# Patient Record
Sex: MALE
[De-identification: names, ages, dates, MRNs, and addresses within clinical notes are randomized per-mention and may not be internally consistent; named-entity substitution may affect disease eponyms.]

---

## 2018-08-09 ENCOUNTER — HOSPITAL ENCOUNTER (EMERGENCY)
Dept: HOSPITAL 14 - H.ER | Age: 63
Discharge: HOME | End: 2018-08-09
Payer: MEDICAID

## 2018-08-09 VITALS — RESPIRATION RATE: 16 BRPM | TEMPERATURE: 98.5 F

## 2018-08-09 VITALS — SYSTOLIC BLOOD PRESSURE: 120 MMHG | HEART RATE: 76 BPM | DIASTOLIC BLOOD PRESSURE: 70 MMHG

## 2018-08-09 DIAGNOSIS — Z79.84: ICD-10-CM

## 2018-08-09 DIAGNOSIS — E11.65: ICD-10-CM

## 2018-08-09 DIAGNOSIS — F17.200: ICD-10-CM

## 2018-08-09 DIAGNOSIS — B37.42: Primary | ICD-10-CM

## 2018-08-09 LAB
ALBUMIN SERPL-MCNC: 4.2 G/DL (ref 3.5–5)
ALBUMIN/GLOB SERPL: 1.3 {RATIO} (ref 1–2.1)
ALT SERPL-CCNC: 91 U/L (ref 21–72)
AST SERPL-CCNC: 110 U/L (ref 17–59)
BASOPHILS # BLD AUTO: 0.1 K/UL (ref 0–0.2)
BASOPHILS NFR BLD: 1.3 % (ref 0–2)
BILIRUB UR-MCNC: NEGATIVE MG/DL
BUN SERPL-MCNC: 7 MG/DL (ref 9–20)
CALCIUM SERPL-MCNC: 9.2 MG/DL (ref 8.4–10.2)
COLOR UR: (no result)
EOSINOPHIL # BLD AUTO: 0.1 K/UL (ref 0–0.7)
EOSINOPHIL NFR BLD: 1.8 % (ref 0–4)
ERYTHROCYTE [DISTWIDTH] IN BLOOD BY AUTOMATED COUNT: 12.4 % (ref 11.5–14.5)
GFR NON-AFRICAN AMERICAN: > 60
GLUCOSE UR STRIP-MCNC: >=500 MG/DL
HGB BLD-MCNC: 14.5 G/DL (ref 12–18)
LEUKOCYTE ESTERASE UR-ACNC: (no result) LEU/UL
LYMPHOCYTES # BLD AUTO: 1.6 K/UL (ref 1–4.3)
LYMPHOCYTES NFR BLD AUTO: 28.9 % (ref 20–40)
MCH RBC QN AUTO: 34.6 PG (ref 27–31)
MCHC RBC AUTO-ENTMCNC: 34.7 G/DL (ref 33–37)
MCV RBC AUTO: 100 FL (ref 80–94)
MONOCYTES # BLD: 0.4 K/UL (ref 0–0.8)
MONOCYTES NFR BLD: 7.5 % (ref 0–10)
NEUTROPHILS # BLD: 3.3 K/UL (ref 1.8–7)
NEUTROPHILS NFR BLD AUTO: 60.5 % (ref 50–75)
NRBC BLD AUTO-RTO: 0 % (ref 0–0)
PH UR STRIP: 6 [PH] (ref 5–8)
PLATELET # BLD: 69 K/UL (ref 130–400)
PMV BLD AUTO: 10.9 FL (ref 7.2–11.7)
PROT UR STRIP-MCNC: NEGATIVE MG/DL
RBC # BLD AUTO: 4.19 MIL/UL (ref 4.4–5.9)
RBC # UR STRIP: NEGATIVE /UL
SP GR UR STRIP: 1.02 (ref 1–1.03)
SQUAMOUS EPITHIAL: < 1 /HPF (ref 0–5)
URINE CLARITY: CLEAR
UROBILINOGEN UR-MCNC: (no result) MG/DL (ref 0.2–1)
WBC # BLD AUTO: 5.4 K/UL (ref 4.8–10.8)

## 2018-08-09 NOTE — ED PDOC
Hyperglycemia/Hypoglycemia


Time Seen by Provider: 08/09/18 17:00


Chief Complaint (Nursing): High Blood Sugar


Chief Complaint (Provider): groin pain


History Per: Patient, Family (daughter)


History/Exam Limitations: language barrier ([place])


Onset/Duration Of Symptoms: Days (x2)


Current Symptoms Are (Timing): Still Present


***: The patient does not have any of the infectious symptoms listed except for 

those marked.


Additional Complaint(s): 


62 year old male arrives to ED with daughter-in-law for an evaluation of 

dysuria associated with frequency since midnight. Patient admits to drinking 

alcohol earlier today. He denies any fever, chills, vomiting or bloody urine.





PMD: Dr. Basil Villagran





Past Medical History


Reviewed: Historical Data (whiskey daily), Nursing Documentation, Vital Signs


Vital Signs: 


 Last Vital Signs











Temp  98.5 F   08/09/18 16:43


 


Pulse  102 H  08/09/18 16:43


 


Resp  16   08/09/18 16:43


 


BP  121/87   08/09/18 16:43


 


Pulse Ox  95   08/09/18 16:43














- Medical History


PMH: Diabetes





- Surgical History


Surgical History: Appendectomy





- Family History


Family History: States: Unknown Family Hx





- Social History


Current smoker - smoking cessation education provided: Yes


Alcohol: > 2 Drinks/Day


Drugs: Denies





- Immunization History


Hx Tetanus Toxoid Vaccination: No


Hx Influenza Vaccination: No


Hx Pneumococcal Vaccination: No





- Home Medications


Home Medications: 


 Ambulatory Orders











 Medication  Instructions  Recorded


 


Clotrimazole 1% Cream [Lotrimin 1%] 1 applic EXT BID #1 tube 08/09/18


 


metFORMIN [glucOPHAGE] 500 mg PO BID #20 tab 08/09/18














- Allergies


Allergies/Adverse Reactions: 


 Allergies











Allergy/AdvReac Type Severity Reaction Status Date / Time


 


No Known Allergies Allergy   Verified 08/09/18 17:45














Review of Systems


ROS Statement: Except As Marked, All Systems Reviewed And Found Negative


Constitutional: Negative for: Fever, Chills


Gastrointestinal: Negative for: Nausea, Vomiting, Diarrhea


Genitourinary Male: Positive for: Dysuria, Frequency.  Negative for: Hematuria





Physical Exam





- Reviewed


Nursing Documentation Reviewed: Yes


Vital Signs Reviewed: Yes





- Physical Exam


Appears: Positive for: Non-toxic, No Acute Distress


Head Exam: Positive for: ATRAUMATIC, NORMAL INSPECTION, NORMOCEPHALIC


Skin: Positive for: Normal Color


Eye Exam: Positive for: Normal appearance


ENT: Positive for: Normal ENT Inspection


Neck: Positive for: Supple


Cardiovascular/Chest: Positive for: Regular Rate, Rhythm


Respiratory: Positive for: Normal Breath Sounds


Gastrointestinal/Abdominal: Positive for: Soft.  Negative for: Tenderness


Male Genital Exam: Positive for: other (circumcised male; red/white discharge 

noted, consistent with fungal infection. )


Extremity: Positive for: Normal ROM


Neurologic/Psych: Positive for: Alert, Oriented


Comments: 





ER Tech Russel Moris as chaperone for  exam.





- Laboratory Results


Result Diagrams: 


 08/09/18 18:55





 08/09/18 18:55





- ECG


O2 Sat by Pulse Oximetry: 95 (RA)


Pulse Ox Interpretation: Normal





Medical Decision Making


Medical Decision Making: 


Initial Impression: Dysuria rule out infection, electrolyte abnormality





Initial Plan:


* CMP


* CBC


* Urine C&S


* UA





1900


Patient with elevated blood sugar, insulin given.


Patient to be signed out to Dr. Spence pending labs and reeval.





--------------------------------------------------------------------------------

-----------------


Scribe Attestation:


Documented by Lina Jamil, acting as a scribe for Prakash Valdez MD.





Provider Scribe Attestation:


All medical record entries made by the Scribe were at my direction and 

personally dictated by me. I have reviewed the chart and agree that the record 

accurately reflects my personal performance of the history, physical exam, 

medical decision making, and the department course for this patient. I have 

also personally directed, reviewed, and agree with the discharge instructions 

and disposition.





Disposition





- Clinical Impression


Clinical Impression: 


 Candidal balanitis, Hyperglycemia








- Patient ED Disposition


Is Patient to be Admitted: Transfer of Care





- Disposition


Referrals: 


[facility] [Outside]


Disposition: Transfer of Care


Disposition Time: 19:00


Condition: STABLE


Prescriptions: 


Clotrimazole 1% Cream [Lotrimin 1%] 1 applic EXT BID #1 tube


metFORMIN [glucOPHAGE] 500 mg PO BID #20 tab


Instructions:  Balanitis, The ABCs of Diabetes


Forms:  [facility] (English)


Print Language: Stateless


Patient Signed Over To: Willie Spence

## 2018-08-09 NOTE — ED PDOC
- Laboratory Results


Result Diagrams: 


 18 18:55





 18 18:55





- ECG


O2 Sat by Pulse Oximetry: 99 (RA)


Pulse Ox Interpretation: Normal





Medical Decision Making


Medical Decision Makin


Patient signed out to me by Dr. Valdez pending labs and reassessment.





2100


Labs reviewed and patient has no clinically significant abnormalities. 


On reassessment, patient states he has a history of diabetes and was on an oral 

hyperglycemic medication (unsure of name) but has not had access to the 

medication since he left [place]. 


Patient to be discharged home with prescription for Metformin and Clotrimazole.


Diagnosis: fungal balanitis, hyperglycemia





Scribe Attestation:


Documented by Jada Manzanares, acting as a scribe for Willie Spence MD.





Provider Scribe Attestation:


All medical record entries made by the Scribe were at my direction and 

personally dictated by me. I have reviewed the chart and agree that the record 

accurately reflects my personal performance of the history, physical exam, 

medical decision making, and the department course for this patient. I have 

also personally directed, reviewed, and agree with the discharge instructions 

and disposition.








Disposition





- Clinical Impression


Clinical Impression: 


 Candidal balanitis, Hyperglycemia








- POA


Present On Arrival: None





- Disposition


Referrals: 


[facility] [Outside]


Disposition: Routine/Home


Disposition Time: 21:10


Condition: STABLE


Prescriptions: 


Clotrimazole 1% Cream [Lotrimin 1%] 1 applic EXT BID #1 tube


metFORMIN [glucOPHAGE] 500 mg PO BID #20 tab


Instructions:  Balanitis, The ABCs of Diabetes


Forms:  [facility] (English)


Print Language: Romanian

## 2018-08-12 VITALS — OXYGEN SATURATION: 95 %
